# Patient Record
Sex: FEMALE | Race: WHITE | NOT HISPANIC OR LATINO | URBAN - METROPOLITAN AREA
[De-identification: names, ages, dates, MRNs, and addresses within clinical notes are randomized per-mention and may not be internally consistent; named-entity substitution may affect disease eponyms.]

---

## 2019-12-30 ENCOUNTER — AMBULATORY SURGICAL CENTER (OUTPATIENT)
Dept: URBAN - METROPOLITAN AREA SURGERY 3 | Facility: SURGERY | Age: 64
End: 2019-12-30
Payer: COMMERCIAL

## 2019-12-30 ENCOUNTER — OFFICE (OUTPATIENT)
Dept: URBAN - METROPOLITAN AREA PATHOLOGY 22 | Facility: PATHOLOGY | Age: 64
End: 2019-12-30
Payer: COMMERCIAL

## 2019-12-30 VITALS
DIASTOLIC BLOOD PRESSURE: 50 MMHG | TEMPERATURE: 98 F | DIASTOLIC BLOOD PRESSURE: 50 MMHG | DIASTOLIC BLOOD PRESSURE: 84 MMHG | SYSTOLIC BLOOD PRESSURE: 142 MMHG | SYSTOLIC BLOOD PRESSURE: 144 MMHG | HEART RATE: 64 BPM | DIASTOLIC BLOOD PRESSURE: 84 MMHG | TEMPERATURE: 97.9 F | SYSTOLIC BLOOD PRESSURE: 131 MMHG | DIASTOLIC BLOOD PRESSURE: 81 MMHG | OXYGEN SATURATION: 99 % | RESPIRATION RATE: 18 BRPM | OXYGEN SATURATION: 94 % | OXYGEN SATURATION: 99 % | DIASTOLIC BLOOD PRESSURE: 76 MMHG | HEART RATE: 58 BPM | TEMPERATURE: 98 F | SYSTOLIC BLOOD PRESSURE: 144 MMHG | HEART RATE: 68 BPM | SYSTOLIC BLOOD PRESSURE: 131 MMHG | SYSTOLIC BLOOD PRESSURE: 131 MMHG | HEART RATE: 69 BPM | DIASTOLIC BLOOD PRESSURE: 81 MMHG | HEART RATE: 69 BPM | HEART RATE: 64 BPM | HEART RATE: 58 BPM | DIASTOLIC BLOOD PRESSURE: 76 MMHG | HEIGHT: 61 IN | DIASTOLIC BLOOD PRESSURE: 81 MMHG | DIASTOLIC BLOOD PRESSURE: 50 MMHG | SYSTOLIC BLOOD PRESSURE: 136 MMHG | SYSTOLIC BLOOD PRESSURE: 129 MMHG | HEART RATE: 72 BPM | RESPIRATION RATE: 18 BRPM | RESPIRATION RATE: 19 BRPM | TEMPERATURE: 98 F | HEART RATE: 68 BPM | TEMPERATURE: 97.9 F | SYSTOLIC BLOOD PRESSURE: 142 MMHG | DIASTOLIC BLOOD PRESSURE: 84 MMHG | HEIGHT: 61 IN | OXYGEN SATURATION: 98 % | SYSTOLIC BLOOD PRESSURE: 144 MMHG | DIASTOLIC BLOOD PRESSURE: 76 MMHG | OXYGEN SATURATION: 99 % | RESPIRATION RATE: 19 BRPM | HEART RATE: 68 BPM | WEIGHT: 118 LBS | SYSTOLIC BLOOD PRESSURE: 136 MMHG | HEART RATE: 72 BPM | OXYGEN SATURATION: 97 % | OXYGEN SATURATION: 98 % | HEART RATE: 69 BPM | HEIGHT: 61 IN | SYSTOLIC BLOOD PRESSURE: 129 MMHG | WEIGHT: 118 LBS | WEIGHT: 118 LBS | HEART RATE: 64 BPM | RESPIRATION RATE: 19 BRPM | SYSTOLIC BLOOD PRESSURE: 142 MMHG | OXYGEN SATURATION: 97 % | OXYGEN SATURATION: 94 % | HEART RATE: 58 BPM | HEART RATE: 72 BPM | OXYGEN SATURATION: 94 % | OXYGEN SATURATION: 98 % | OXYGEN SATURATION: 97 % | SYSTOLIC BLOOD PRESSURE: 136 MMHG | SYSTOLIC BLOOD PRESSURE: 129 MMHG | TEMPERATURE: 97.9 F | RESPIRATION RATE: 18 BRPM

## 2019-12-30 DIAGNOSIS — Z12.11 ENCOUNTER FOR SCREENING FOR MALIGNANT NEOPLASM OF COLON: ICD-10-CM

## 2019-12-30 DIAGNOSIS — K62.1 RECTAL POLYP: ICD-10-CM

## 2019-12-30 PROCEDURE — 88305 TISSUE EXAM BY PATHOLOGIST: CPT | Performed by: INTERNAL MEDICINE

## 2019-12-30 PROCEDURE — 45380 COLONOSCOPY AND BIOPSY: CPT | Mod: 33 | Performed by: INTERNAL MEDICINE

## 2020-08-04 ENCOUNTER — OFFICE (OUTPATIENT)
Dept: URBAN - METROPOLITAN AREA CLINIC 11 | Facility: CLINIC | Age: 65
End: 2020-08-04

## 2020-08-04 VITALS
SYSTOLIC BLOOD PRESSURE: 163 MMHG | HEIGHT: 61 IN | WEIGHT: 120 LBS | HEART RATE: 77 BPM | DIASTOLIC BLOOD PRESSURE: 84 MMHG

## 2020-08-04 DIAGNOSIS — R19.7 DIARRHEA, UNSPECIFIED: ICD-10-CM

## 2020-08-04 PROCEDURE — 99214 OFFICE O/P EST MOD 30 MIN: CPT | Performed by: INTERNAL MEDICINE

## 2020-08-04 NOTE — SERVICENOTES
We discussed her chronic diarrhea, discussed a dif dx, and evaluation by stools and FSC with bx.  Given the reviewed hx of microscopic colitis in 2009, I suspect that this is the same issue.  We discussed tx option depending on the findings.

## 2020-08-04 NOTE — SERVICEHPINOTES
"I have diarrhea and constipation issues."  She stated that she has difficulty describing her situation.  She has alternating diarrhea and constipation.  This has been assocaited with some urgency with stools and even some leakage at tiems.  The diarrhea sometimes comes after eating but she has not noted a particular food that causes issues.  She has had issues for several years dating back to about 2009. She has recently had an increase in the diarrhea since returning from [place]. The stools have been watery and occuring about 3-4 times a day.  She has not had associated abdominal pain with the diarrhea.  She has noted a spot of blood once.  She has been taking Imodium which helps but causes the constipation issue. Recently she had a sore throat and was checked for strep but not COVID a few weeks ago. This was associated with some nausea but not vomiting or fevers.  She had colonoscopy in 2009 with random bx revealing some lymphocytosis.  In the chart she was treated with budesonide but did not follow up afterward.  She did not recall taking anything.  She had a colonoscopy in December which was benign. .

## 2020-08-05 ENCOUNTER — OFFICE (OUTPATIENT)
Dept: URBAN - METROPOLITAN AREA CLINIC 11 | Facility: CLINIC | Age: 65
End: 2020-08-05

## 2020-08-05 LAB
GI PROFILE, STOOL, PCR: ADENOVIRUS F 40/41: NOT DETECTED
GI PROFILE, STOOL, PCR: ASTROVIRUS: NOT DETECTED
GI PROFILE, STOOL, PCR: C DIFFICILE TOXIN A/B: NOT DETECTED
GI PROFILE, STOOL, PCR: CAMPYLOBACTER: NOT DETECTED
GI PROFILE, STOOL, PCR: CRYPTOSPORIDIUM: NOT DETECTED
GI PROFILE, STOOL, PCR: CYCLOSPORA CAYETANENSIS: NOT DETECTED
GI PROFILE, STOOL, PCR: E COLI O157: (no result)
GI PROFILE, STOOL, PCR: ENTAMOEBA HISTOLYTICA: NOT DETECTED
GI PROFILE, STOOL, PCR: ENTEROAGGREGATIVE E COLI: NOT DETECTED
GI PROFILE, STOOL, PCR: ENTEROPATHOGENIC E COLI: NOT DETECTED
GI PROFILE, STOOL, PCR: ENTEROTOXIGENIC E COLI: NOT DETECTED
GI PROFILE, STOOL, PCR: GIARDIA LAMBLIA: NOT DETECTED
GI PROFILE, STOOL, PCR: NOROVIRUS GI/GII: NOT DETECTED
GI PROFILE, STOOL, PCR: PLESIOMONAS SHIGELLOIDES: NOT DETECTED
GI PROFILE, STOOL, PCR: ROTAVIRUS A: NOT DETECTED
GI PROFILE, STOOL, PCR: SALMONELLA: NOT DETECTED
GI PROFILE, STOOL, PCR: SAPOVIRUS: NOT DETECTED
GI PROFILE, STOOL, PCR: SHIGA-TOXIN-PRODUCING E COLI: NOT DETECTED
GI PROFILE, STOOL, PCR: SHIGELLA/ENTEROINVASIVE E COLI: NOT DETECTED
GI PROFILE, STOOL, PCR: VIBRIO CHOLERAE: NOT DETECTED
GI PROFILE, STOOL, PCR: VIBRIO: NOT DETECTED
GI PROFILE, STOOL, PCR: YERSINIA ENTEROCOLITICA: NOT DETECTED

## 2020-08-13 ENCOUNTER — AMBULATORY SURGICAL CENTER (OUTPATIENT)
Dept: URBAN - METROPOLITAN AREA SURGERY 3 | Facility: SURGERY | Age: 65
End: 2020-08-13

## 2020-08-13 ENCOUNTER — OFFICE (OUTPATIENT)
Dept: URBAN - METROPOLITAN AREA PATHOLOGY 22 | Facility: PATHOLOGY | Age: 65
End: 2020-08-13

## 2020-08-13 VITALS
HEART RATE: 68 BPM | DIASTOLIC BLOOD PRESSURE: 93 MMHG | RESPIRATION RATE: 16 BRPM | OXYGEN SATURATION: 99 % | SYSTOLIC BLOOD PRESSURE: 118 MMHG | RESPIRATION RATE: 17 BRPM | DIASTOLIC BLOOD PRESSURE: 95 MMHG | HEART RATE: 67 BPM | OXYGEN SATURATION: 100 % | SYSTOLIC BLOOD PRESSURE: 146 MMHG | DIASTOLIC BLOOD PRESSURE: 77 MMHG | RESPIRATION RATE: 18 BRPM | RESPIRATION RATE: 18 BRPM | SYSTOLIC BLOOD PRESSURE: 111 MMHG | RESPIRATION RATE: 16 BRPM | HEART RATE: 71 BPM | OXYGEN SATURATION: 99 % | HEIGHT: 61 IN | HEART RATE: 64 BPM | DIASTOLIC BLOOD PRESSURE: 75 MMHG | SYSTOLIC BLOOD PRESSURE: 146 MMHG | SYSTOLIC BLOOD PRESSURE: 113 MMHG | HEIGHT: 61 IN | HEART RATE: 64 BPM | DIASTOLIC BLOOD PRESSURE: 62 MMHG | SYSTOLIC BLOOD PRESSURE: 139 MMHG | WEIGHT: 120 LBS | HEART RATE: 64 BPM | SYSTOLIC BLOOD PRESSURE: 111 MMHG | DIASTOLIC BLOOD PRESSURE: 77 MMHG | DIASTOLIC BLOOD PRESSURE: 62 MMHG | OXYGEN SATURATION: 100 % | OXYGEN SATURATION: 99 % | TEMPERATURE: 97.1 F | SYSTOLIC BLOOD PRESSURE: 113 MMHG | HEART RATE: 71 BPM | DIASTOLIC BLOOD PRESSURE: 93 MMHG | HEART RATE: 68 BPM | DIASTOLIC BLOOD PRESSURE: 95 MMHG | RESPIRATION RATE: 17 BRPM | OXYGEN SATURATION: 96 % | TEMPERATURE: 97.2 F | OXYGEN SATURATION: 96 % | SYSTOLIC BLOOD PRESSURE: 146 MMHG | OXYGEN SATURATION: 100 % | DIASTOLIC BLOOD PRESSURE: 62 MMHG | DIASTOLIC BLOOD PRESSURE: 75 MMHG | SYSTOLIC BLOOD PRESSURE: 111 MMHG | TEMPERATURE: 97.1 F | TEMPERATURE: 97.1 F | OXYGEN SATURATION: 98 % | HEART RATE: 68 BPM | SYSTOLIC BLOOD PRESSURE: 139 MMHG | OXYGEN SATURATION: 98 % | WEIGHT: 120 LBS | SYSTOLIC BLOOD PRESSURE: 113 MMHG | RESPIRATION RATE: 18 BRPM | RESPIRATION RATE: 16 BRPM | DIASTOLIC BLOOD PRESSURE: 93 MMHG | OXYGEN SATURATION: 96 % | TEMPERATURE: 97.2 F | SYSTOLIC BLOOD PRESSURE: 118 MMHG | HEART RATE: 67 BPM | TEMPERATURE: 97.2 F | HEART RATE: 67 BPM | SYSTOLIC BLOOD PRESSURE: 139 MMHG | RESPIRATION RATE: 17 BRPM | DIASTOLIC BLOOD PRESSURE: 75 MMHG | OXYGEN SATURATION: 98 % | WEIGHT: 120 LBS | HEIGHT: 61 IN | SYSTOLIC BLOOD PRESSURE: 118 MMHG | DIASTOLIC BLOOD PRESSURE: 77 MMHG | HEART RATE: 71 BPM | DIASTOLIC BLOOD PRESSURE: 95 MMHG

## 2020-08-13 DIAGNOSIS — K52.832 LYMPHOCYTIC COLITIS: ICD-10-CM

## 2020-08-13 PROCEDURE — 88305 TISSUE EXAM BY PATHOLOGIST: CPT | Performed by: INTERNAL MEDICINE

## 2020-08-13 PROCEDURE — 45331 SIGMOIDOSCOPY AND BIOPSY: CPT | Performed by: INTERNAL MEDICINE

## 2020-09-25 ENCOUNTER — OFFICE (OUTPATIENT)
Dept: URBAN - METROPOLITAN AREA CLINIC 11 | Facility: CLINIC | Age: 65
End: 2020-09-25

## 2020-09-25 VITALS
OXYGEN SATURATION: 99 % | WEIGHT: 123 LBS | SYSTOLIC BLOOD PRESSURE: 120 MMHG | HEIGHT: 61 IN | HEART RATE: 76 BPM | DIASTOLIC BLOOD PRESSURE: 72 MMHG

## 2020-09-25 DIAGNOSIS — K52.832 LYMPHOCYTIC COLITIS: ICD-10-CM

## 2020-09-25 PROCEDURE — 99214 OFFICE O/P EST MOD 30 MIN: CPT | Performed by: NURSE PRACTITIONER

## 2020-09-25 RX ORDER — MESALAMINE 1.2 G/1
2.4 TABLET, DELAYED RELEASE ORAL
Qty: 180 | Refills: 3 | Status: COMPLETED
Start: 2020-08-14 | End: 2024-06-21

## 2020-09-25 NOTE — SERVICEHPINOTES
Ms. Messer is a 65 year old female that presents today for follow up after flexible sigmoidoscopy. She notes diarrhea is much better. She notes she has 1-3 bowel movements per day. She notes that she can sometimes go a couple of days without a bowel movement. Stools range from soft and loose to hard and formed. She notes that she takes Lialda 2 tablets in the morning when she eats. She denies hematochezia and melena. She denies any abdominal pain.08/13/20: FSC: lymphocytic colitis

## 2020-09-25 NOTE — SERVICENOTES
Pt was seen and evaluated with Charlotte SANDERS.  I agree with the above evaluation and plan as we discussed.  We discussed the use of the Lialda for the microscopic colitis, typical course of the colitis and attempt to stop her meds after about 3 months of control (but not during the Holidays). She has a hx of chronic constipation and for now I would add Benefiber prior to other meds.  We reviewed her colon reports and path reports.  We discussed the use and risks of the meds. We also discussed her urticaria issues and her f/u with Dr. Chopra for steroids.

## 2021-01-25 ENCOUNTER — OFFICE (OUTPATIENT)
Dept: URBAN - METROPOLITAN AREA CLINIC 11 | Facility: CLINIC | Age: 66
End: 2021-01-25

## 2021-01-25 VITALS
HEART RATE: 73 BPM | OXYGEN SATURATION: 99 % | DIASTOLIC BLOOD PRESSURE: 74 MMHG | WEIGHT: 123 LBS | SYSTOLIC BLOOD PRESSURE: 130 MMHG | HEIGHT: 61 IN

## 2021-01-25 DIAGNOSIS — K52.832 LYMPHOCYTIC COLITIS: ICD-10-CM

## 2021-01-25 LAB
BASIC METABOLIC PANEL (8): BUN/CREATININE RATIO: 23 (ref 12–28)
BASIC METABOLIC PANEL (8): BUN: 15 MG/DL (ref 8–27)
BASIC METABOLIC PANEL (8): CALCIUM: 9.3 MG/DL (ref 8.7–10.3)
BASIC METABOLIC PANEL (8): CARBON DIOXIDE, TOTAL: 25 MMOL/L (ref 20–29)
BASIC METABOLIC PANEL (8): CHLORIDE: 103 MMOL/L (ref 96–106)
BASIC METABOLIC PANEL (8): CREATININE: 0.66 MG/DL (ref 0.57–1)
BASIC METABOLIC PANEL (8): EGFR IF AFRICN AM: 109 ML/MIN/1.73 (ref 59–?)
BASIC METABOLIC PANEL (8): EGFR IF NONAFRICN AM: 94 ML/MIN/1.73 (ref 59–?)
BASIC METABOLIC PANEL (8): GLUCOSE: 65 MG/DL (ref 65–99)
BASIC METABOLIC PANEL (8): POTASSIUM: 4.6 MMOL/L (ref 3.5–5.2)
BASIC METABOLIC PANEL (8): SODIUM: 141 MMOL/L (ref 134–144)

## 2021-01-25 PROCEDURE — 99214 OFFICE O/P EST MOD 30 MIN: CPT | Performed by: INTERNAL MEDICINE

## 2021-01-25 RX ORDER — MESALAMINE 1.2 G/1
2.4 TABLET, DELAYED RELEASE ORAL
Qty: 180 | Refills: 3 | Status: COMPLETED
Start: 2020-08-14 | End: 2024-06-21

## 2021-01-25 NOTE — SERVICENOTES
She has been doing quite well on the Lialda.  We discussed a possible increase to 3 po daily of the lialda but she wanted to stay where she was.  We discussed the addition of the fiber supplements which may help.  She will need her creatinine and BUN with the Lialda.  We discussed the long term use and risks of the Lialda.

## 2021-01-25 NOTE — SERVICEHPINOTES
"It's better and I like the pills." She has been consistent with her meds and liked the impovement in her bowel patterns.  She stated that she is able to go places without concners about having diarrhea.  She stated that she has 1-2 stools a day.  She has diarrhea at times but did nto think it was a problem.  When pressed she may have diarrhea about once every week or so.  She stated it had not bothered her.  She has had some issues with passing small stools and that it may take more than one trip to get the stools out. She has not been on Befiber or any other fiber supplements as previously directed.

## 2021-09-27 ENCOUNTER — OFFICE (OUTPATIENT)
Dept: URBAN - METROPOLITAN AREA CLINIC 11 | Facility: CLINIC | Age: 66
End: 2021-09-27

## 2021-09-27 VITALS
HEART RATE: 78 BPM | DIASTOLIC BLOOD PRESSURE: 80 MMHG | OXYGEN SATURATION: 97 % | HEIGHT: 61 IN | SYSTOLIC BLOOD PRESSURE: 146 MMHG | WEIGHT: 122 LBS

## 2021-09-27 DIAGNOSIS — K52.832 LYMPHOCYTIC COLITIS: ICD-10-CM

## 2021-09-27 PROCEDURE — 99214 OFFICE O/P EST MOD 30 MIN: CPT | Performed by: INTERNAL MEDICINE

## 2021-09-27 NOTE — SERVICEHPINOTES
She presents for f/u of her microscopic colitis. She has been on Lialda 1.2grams 2 po daily with her daily metamucil.  She has a normal stool about once each morning.  If she misses the Metamucil she will have softer stools.  She has been concerned about trying to stop the Lialda due to the risks of diarrhea.  If she misses them, like if she is out of town, she had not noted a return of the diarrhea.She has been on meds for just over a year.

## 2021-09-27 NOTE — SERVICENOTES
She has been doing quite well with her microscopic colitis.  We discussed the use of the meds, a taper of the Lialda, and her concerns about stopping the meds with the risks of recurrent diarrhea off of them.  She is going to drop the Lialda to one tablet daily for a month and then try to stop it.  She had normal labs in July.

## 2021-12-21 ENCOUNTER — OFFICE (OUTPATIENT)
Dept: URBAN - METROPOLITAN AREA CLINIC 11 | Facility: CLINIC | Age: 66
End: 2021-12-21

## 2022-06-30 ENCOUNTER — OFFICE (OUTPATIENT)
Dept: URBAN - METROPOLITAN AREA CLINIC 11 | Facility: CLINIC | Age: 67
End: 2022-06-30

## 2022-06-30 VITALS
HEART RATE: 71 BPM | OXYGEN SATURATION: 99 % | SYSTOLIC BLOOD PRESSURE: 160 MMHG | HEIGHT: 61 IN | DIASTOLIC BLOOD PRESSURE: 91 MMHG | WEIGHT: 121.8 LBS

## 2022-06-30 DIAGNOSIS — K52.832 LYMPHOCYTIC COLITIS: ICD-10-CM

## 2022-06-30 DIAGNOSIS — N81.89 OTHER FEMALE GENITAL PROLAPSE: ICD-10-CM

## 2022-06-30 LAB
BASIC METABOLIC PANEL (8): BUN/CREATININE RATIO: 29 — HIGH (ref 12–28)
BASIC METABOLIC PANEL (8): BUN: 22 MG/DL (ref 8–27)
BASIC METABOLIC PANEL (8): CALCIUM: 9.5 MG/DL (ref 8.7–10.3)
BASIC METABOLIC PANEL (8): CARBON DIOXIDE, TOTAL: 26 MMOL/L (ref 20–29)
BASIC METABOLIC PANEL (8): CHLORIDE: 102 MMOL/L (ref 96–106)
BASIC METABOLIC PANEL (8): CREATININE: 0.75 MG/DL (ref 0.57–1)
BASIC METABOLIC PANEL (8): EGFR: 88 ML/MIN/1.73 (ref 59–?)
BASIC METABOLIC PANEL (8): GLUCOSE: 74 MG/DL (ref 65–99)
BASIC METABOLIC PANEL (8): POTASSIUM: 4.8 MMOL/L (ref 3.5–5.2)
BASIC METABOLIC PANEL (8): SODIUM: 140 MMOL/L (ref 134–144)

## 2022-06-30 PROCEDURE — 99214 OFFICE O/P EST MOD 30 MIN: CPT | Performed by: INTERNAL MEDICINE

## 2022-06-30 RX ORDER — MESALAMINE 1.2 G/1
2.4 TABLET, DELAYED RELEASE ORAL
Qty: 180 | Refills: 3 | Status: COMPLETED
Start: 2020-08-14 | End: 2024-06-21

## 2022-06-30 NOTE — SERVICEHPINOTES
Pt presents for f/u of her microscopic colitis. She has been controlled on Lialda.  We had tried to taper the meds in December but this was complicated by C. diff after taking Clinda and Diflucan. She was treated with Vanco.  She has been concerned about taking the antibiotics again for an upcomiing second root canal.  She is currently having normal stools on the Lialda.  She has cuauhtemoc also concerned about recurrent diarrhea if she stops the Lialda.  She stated that "every now and then" she has had some constipation while traveling.
br
br  She has also noted some anal leakage after coughing or heavy laughing which may occur every few weeks. She has not had leakage otherwise. She has has also had some rare issues with urinary leakage.

## 2022-06-30 NOTE — SERVICENOTES
We discussed her microscopic colitis and hesitancy of coming off of her Lialda. A this point, I would like to see a year of improvement prior to a reattempt at stopping her meds.  We discussed the risks of recurrent C. diff and need for Clinda for her dental procedures. We also discussed her stooling issues, kegels, and possible referral for evaluation. She did not want to do the referral yet.  She will need her BMP to f/u renal function on LIalda.

## 2024-06-21 ENCOUNTER — OFFICE (OUTPATIENT)
Dept: URBAN - METROPOLITAN AREA CLINIC 11 | Facility: CLINIC | Age: 69
End: 2024-06-21

## 2024-06-21 VITALS
HEIGHT: 61 IN | DIASTOLIC BLOOD PRESSURE: 56 MMHG | WEIGHT: 119 LBS | SYSTOLIC BLOOD PRESSURE: 100 MMHG | HEART RATE: 81 BPM | OXYGEN SATURATION: 100 %

## 2024-06-21 DIAGNOSIS — R19.7 DIARRHEA, UNSPECIFIED: ICD-10-CM

## 2024-06-21 DIAGNOSIS — K52.832 LYMPHOCYTIC COLITIS: ICD-10-CM

## 2024-06-21 PROCEDURE — 99214 OFFICE O/P EST MOD 30 MIN: CPT | Performed by: INTERNAL MEDICINE

## 2024-06-21 RX ORDER — CIPROFLOXACIN HYDROCHLORIDE 500 MG/1
TABLET, FILM COATED ORAL
Qty: 14 | Refills: 0 | Status: COMPLETED
Start: 2024-06-21 | End: 2024-07-02

## 2024-07-02 ENCOUNTER — OFFICE (OUTPATIENT)
Dept: URBAN - METROPOLITAN AREA CLINIC 11 | Facility: CLINIC | Age: 69
End: 2024-07-02

## 2024-07-02 VITALS
OXYGEN SATURATION: 97 % | DIASTOLIC BLOOD PRESSURE: 83 MMHG | HEART RATE: 66 BPM | SYSTOLIC BLOOD PRESSURE: 137 MMHG | WEIGHT: 113 LBS | HEIGHT: 61 IN

## 2024-07-02 DIAGNOSIS — K52.832 LYMPHOCYTIC COLITIS: ICD-10-CM

## 2024-07-02 PROCEDURE — 99214 OFFICE O/P EST MOD 30 MIN: CPT | Performed by: NURSE PRACTITIONER

## 2024-07-02 RX ORDER — BUDESONIDE 3 MG/1
9 CAPSULE ORAL
Qty: 90 | Refills: 3 | Status: ACTIVE
Start: 2024-07-02

## 2024-07-02 NOTE — SERVICENOTES
We reviewed her recent onset of diarrhea and likelihood that this is recurrent lymphocytic colitis. I would like to trial Budesonide and have her follow up in 4 weeks. If no improvement, we can move forward with FSC, but with significant diarrhea it is reasonable to go ahead and treat at this time.

## 2024-07-02 NOTE — SERVICEHPINOTES
Ms. Messer presents today for follow up of diarrhea with a history of lymphocytic colitis that was previously treated with Lialda with good control. Her stool studies are not all back yet, but it is negative for c diff. She was treated with Cipro and Flagyl without significant improvement. She had thought that her recent new BP medication was cause of the diarrhea, but she did not notice improvement of diarrhea after stopping the medication. She is currently having a bowel movement anywhere from 5-20 times per day. Stools are liquid and brown. She is having several nocturnal stools every night. She denies presence of hematochezia or melena. She was prescribed Lomotil last week, but there has been no significant improvement. Regardless of what she eats, she will have an urgent stool shortly after. No abdominal pain.

## 2024-07-30 ENCOUNTER — OFFICE (OUTPATIENT)
Dept: URBAN - METROPOLITAN AREA CLINIC 11 | Facility: CLINIC | Age: 69
End: 2024-07-30

## 2024-07-30 VITALS
SYSTOLIC BLOOD PRESSURE: 179 MMHG | OXYGEN SATURATION: 99 % | DIASTOLIC BLOOD PRESSURE: 107 MMHG | HEIGHT: 61 IN | HEART RATE: 61 BPM | DIASTOLIC BLOOD PRESSURE: 98 MMHG | WEIGHT: 116 LBS | SYSTOLIC BLOOD PRESSURE: 177 MMHG

## 2024-07-30 DIAGNOSIS — K52.832 LYMPHOCYTIC COLITIS: ICD-10-CM

## 2024-07-30 PROCEDURE — 99213 OFFICE O/P EST LOW 20 MIN: CPT | Performed by: NURSE PRACTITIONER

## 2024-07-30 RX ORDER — DIPHENOXYLATE HYDROCHLORIDE AND ATROPINE SULFATE 2.5; .025 MG/1; MG/1
10 TABLET ORAL
Qty: 120 | Refills: 2 | Status: ACTIVE
Start: 2024-06-28

## 2024-07-30 NOTE — SERVICENOTES
We discussed the improvement of her diarrhea on Budesonide and potential for restarting Lialda after the 12 week course of Budesonide.

## 2024-07-30 NOTE — SERVICEHPINOTES
Ms. Messer presents today for follow up of diarrhea with history of lymphocytic colitis. On 7/2/24 she was prescribed Budesonide 9 mg daily. She notes that it took about a week for the diarrhea to subside. She did have a bout of diarrhea Sunday and yesterday which was short-lived. She had 2-3 bowel movements per day which were loose and brown. She has not yet had any diarrhea today. She notes that she keeps Lomotil on hand in case diarrhea reoccurs. She denies presence of rectal bleeding. She has had some rectal itching and burning. She is using lidocaine cream as needed with improvement of the discomfort.

## 2024-10-30 PROBLEM — K63.5 POLYP OF COLON: Status: ACTIVE | Noted: 2024-10-30

## 2025-01-03 ENCOUNTER — OFFICE (OUTPATIENT)
Dept: URBAN - METROPOLITAN AREA CLINIC 11 | Facility: CLINIC | Age: 70
End: 2025-01-03
Payer: COMMERCIAL

## 2025-01-03 VITALS
DIASTOLIC BLOOD PRESSURE: 102 MMHG | OXYGEN SATURATION: 99 % | SYSTOLIC BLOOD PRESSURE: 188 MMHG | HEIGHT: 61 IN | SYSTOLIC BLOOD PRESSURE: 177 MMHG | HEART RATE: 70 BPM | WEIGHT: 125 LBS | DIASTOLIC BLOOD PRESSURE: 104 MMHG

## 2025-01-03 DIAGNOSIS — K52.832 LYMPHOCYTIC COLITIS: ICD-10-CM

## 2025-01-03 DIAGNOSIS — R19.7 DIARRHEA, UNSPECIFIED: ICD-10-CM

## 2025-01-03 PROCEDURE — 99214 OFFICE O/P EST MOD 30 MIN: CPT | Performed by: NURSE PRACTITIONER

## 2025-01-03 RX ORDER — COLESTIPOL HYDROCHLORIDE 1 G/1
TABLET ORAL
Qty: 180 | Refills: 3 | Status: ACTIVE
Start: 2025-01-03

## 2025-01-03 NOTE — SERVICENOTES
We reviewed her LO with benign biopsies. We can trial Colestid and try to get off Budesonide. Discussed potentially testing for EPI, SIBO, sucrose/fructose intolerance etc.

## 2025-01-03 NOTE — SERVICEHPINOTES
Ms. Messer presents today for follow up of microscopic colitis. She had LO on 10/30/24 which revealed benign biopsies. She currently has a bowel movement at least once per day. Stools are typically soft, formed and brown. She continues to take Budesonide 3 mg per day. She has not had any incontinent episodes since her last visit. No presence of abdominal pain. She notes that she has diarrhea intermittently typically after eating without clear provocation. The diarrhea last occurred in October. No hematochezia or melena.